# Patient Record
Sex: MALE | Race: WHITE | NOT HISPANIC OR LATINO | URBAN - METROPOLITAN AREA
[De-identification: names, ages, dates, MRNs, and addresses within clinical notes are randomized per-mention and may not be internally consistent; named-entity substitution may affect disease eponyms.]

---

## 2018-07-27 ENCOUNTER — OFFICE (OUTPATIENT)
Dept: URBAN - METROPOLITAN AREA CLINIC 33 | Facility: CLINIC | Age: 63
End: 2018-07-27

## 2018-07-27 VITALS
DIASTOLIC BLOOD PRESSURE: 82 MMHG | HEART RATE: 97 BPM | WEIGHT: 238 LBS | SYSTOLIC BLOOD PRESSURE: 133 MMHG | HEIGHT: 70 IN

## 2018-07-27 DIAGNOSIS — Z79.1 LONG TERM (CURRENT) USE OF NON-STEROIDAL ANTI-INFLAMMATORIES: ICD-10-CM

## 2018-07-27 DIAGNOSIS — D64.9 ANEMIA, UNSPECIFIED: ICD-10-CM

## 2018-07-27 DIAGNOSIS — R10.13 EPIGASTRIC PAIN: ICD-10-CM

## 2018-07-27 PROCEDURE — 99245 OFF/OP CONSLTJ NEW/EST HI 55: CPT

## 2018-07-27 PROCEDURE — 00031: CPT

## 2018-07-27 NOTE — SERVICEHPINOTES
KYMBERLY BRIGGS   is a   62   year old male who is being seen in consultation at the request of   MONISHA JEFFREY   for persistent anemia onset since 02/2018. He mentions having arthritis and using long term NSAIDs including Meloxicam and Diclofenac, which he recently stopped. He is followed by orthopedic specialist Dr Campos in [place] who recently did knee replacement 02/2019. He had pre op labs prior to surgery that found anemia and most recent labs by PCP on 06/2018 show RBC 4.51, slight low hgb/hct at 12.4/39.2. He reports epigastric pain described as "burning/dull," occurs few times per week, lasting couple hours, then goes away on its own, improved with drinking milk. He has no h/o PPI or H2RB use. He takes Tums for occasional heartburn that provides relief. He says pain does not wake him up at night. He has BM 1-2x/day, BSS 4 with complete evacuation and no straining. He notes at times will have "diarrhea" that is short lasting and then does not reoccur for months. No fm h/o CRC or IBD. Denies fevers, n/v, lower abdominal pain, melena, blood in stool, change in bowel habits, weight loss.  No prior colonoscopy or EGDBRNo cardiovascular or pulmonary disease

## 2018-08-07 ENCOUNTER — OFFICE (OUTPATIENT)
Dept: URBAN - METROPOLITAN AREA PATHOLOGY 17 | Facility: PATHOLOGY | Age: 63
End: 2018-08-07
Payer: COMMERCIAL

## 2018-08-07 ENCOUNTER — OFFICE (OUTPATIENT)
Dept: URBAN - METROPOLITAN AREA CLINIC 32 | Facility: CLINIC | Age: 63
End: 2018-08-07

## 2018-08-07 VITALS
DIASTOLIC BLOOD PRESSURE: 66 MMHG | WEIGHT: 238 LBS | SYSTOLIC BLOOD PRESSURE: 112 MMHG | RESPIRATION RATE: 21 BRPM | DIASTOLIC BLOOD PRESSURE: 81 MMHG | HEART RATE: 66 BPM | OXYGEN SATURATION: 94 % | SYSTOLIC BLOOD PRESSURE: 102 MMHG | SYSTOLIC BLOOD PRESSURE: 141 MMHG | RESPIRATION RATE: 14 BRPM | DIASTOLIC BLOOD PRESSURE: 70 MMHG | RESPIRATION RATE: 16 BRPM | OXYGEN SATURATION: 100 % | HEART RATE: 67 BPM | HEIGHT: 70 IN | DIASTOLIC BLOOD PRESSURE: 67 MMHG | HEART RATE: 63 BPM | OXYGEN SATURATION: 97 % | SYSTOLIC BLOOD PRESSURE: 113 MMHG | TEMPERATURE: 97.9 F

## 2018-08-07 DIAGNOSIS — D64.9 ANEMIA, UNSPECIFIED: ICD-10-CM

## 2018-08-07 DIAGNOSIS — R10.13 EPIGASTRIC PAIN: ICD-10-CM

## 2018-08-07 DIAGNOSIS — K25.7 CHRONIC GASTRIC ULCER WITHOUT HEMORRHAGE OR PERFORATION: ICD-10-CM

## 2018-08-07 DIAGNOSIS — K26.7 CHRONIC DUODENAL ULCER WITHOUT HEMORRHAGE OR PERFORATION: ICD-10-CM

## 2018-08-07 PROBLEM — K26.9 DUODENAL ULCER, UNSP AS ACUTE OR CHRONIC, W/O HEMOR OR PERF: Status: ACTIVE | Noted: 2018-08-07

## 2018-08-07 PROBLEM — K25.3 ACUTE GASTRIC ULCER WITHOUT HEMORRHAGE OR PERFORATION: Status: ACTIVE | Noted: 2018-08-07

## 2018-08-07 PROCEDURE — 88342 IMHCHEM/IMCYTCHM 1ST ANTB: CPT

## 2018-08-07 PROCEDURE — 88305 TISSUE EXAM BY PATHOLOGIST: CPT

## 2018-08-07 PROCEDURE — 43239 EGD BIOPSY SINGLE/MULTIPLE: CPT

## 2018-08-07 PROCEDURE — 88313 SPECIAL STAINS GROUP 2: CPT

## 2018-08-07 RX ORDER — MELOXICAM 15 MG/1
TABLET ORAL
Qty: 30 | Status: COMPLETED
End: 2018-08-07

## 2018-08-07 RX ORDER — PANTOPRAZOLE SODIUM 40 MG/1
TABLET, DELAYED RELEASE ORAL
Qty: 60 | Refills: 3 | Status: COMPLETED
Start: 2018-08-07 | End: 2018-10-02

## 2018-08-07 RX ORDER — PANTOPRAZOLE SODIUM 40 MG/1
TABLET, DELAYED RELEASE ORAL
Qty: 60 | Refills: 3 | Status: ACTIVE
Start: 2018-08-07

## 2018-08-07 RX ADMIN — LIDOCAINE HYDROCHLORIDE 40 MG: 20 INJECTION, SOLUTION INFILTRATION; PERINEURAL at 13:54

## 2018-08-07 RX ADMIN — LIDOCAINE HYDROCHLORIDE 60 MG: 20 INJECTION, SOLUTION INFILTRATION; PERINEURAL at 13:54

## 2018-09-04 ENCOUNTER — OFFICE (OUTPATIENT)
Dept: URBAN - METROPOLITAN AREA PATHOLOGY 17 | Facility: PATHOLOGY | Age: 63
End: 2018-09-04
Payer: COMMERCIAL

## 2018-09-04 ENCOUNTER — OFFICE (OUTPATIENT)
Dept: URBAN - METROPOLITAN AREA CLINIC 32 | Facility: CLINIC | Age: 63
End: 2018-09-04
Payer: COMMERCIAL

## 2018-09-04 VITALS
RESPIRATION RATE: 20 BRPM | SYSTOLIC BLOOD PRESSURE: 121 MMHG | SYSTOLIC BLOOD PRESSURE: 115 MMHG | RESPIRATION RATE: 18 BRPM | OXYGEN SATURATION: 97 % | DIASTOLIC BLOOD PRESSURE: 83 MMHG | SYSTOLIC BLOOD PRESSURE: 143 MMHG | DIASTOLIC BLOOD PRESSURE: 64 MMHG | SYSTOLIC BLOOD PRESSURE: 125 MMHG | DIASTOLIC BLOOD PRESSURE: 67 MMHG | WEIGHT: 238 LBS | RESPIRATION RATE: 16 BRPM | DIASTOLIC BLOOD PRESSURE: 61 MMHG | SYSTOLIC BLOOD PRESSURE: 110 MMHG | TEMPERATURE: 97.3 F | HEIGHT: 70 IN | DIASTOLIC BLOOD PRESSURE: 76 MMHG | SYSTOLIC BLOOD PRESSURE: 109 MMHG | RESPIRATION RATE: 14 BRPM | HEART RATE: 61 BPM | RESPIRATION RATE: 15 BRPM | DIASTOLIC BLOOD PRESSURE: 65 MMHG | HEART RATE: 56 BPM | TEMPERATURE: 97.5 F | HEART RATE: 58 BPM | HEART RATE: 57 BPM | HEART RATE: 62 BPM | DIASTOLIC BLOOD PRESSURE: 84 MMHG | OXYGEN SATURATION: 93 % | OXYGEN SATURATION: 98 % | SYSTOLIC BLOOD PRESSURE: 127 MMHG | SYSTOLIC BLOOD PRESSURE: 145 MMHG | HEART RATE: 66 BPM

## 2018-09-04 DIAGNOSIS — D12.4 BENIGN NEOPLASM OF DESCENDING COLON: ICD-10-CM

## 2018-09-04 DIAGNOSIS — Z12.11 ENCOUNTER FOR SCREENING FOR MALIGNANT NEOPLASM OF COLON: ICD-10-CM

## 2018-09-04 PROBLEM — K57.30 DVRTCLOS OF LG INT W/O PERFORATION OR ABSCESS W/O BLEEDING: Status: ACTIVE | Noted: 2018-09-04

## 2018-09-04 PROBLEM — D12.0 BENIGN NEOPLASM OF CECUM: Status: ACTIVE | Noted: 2018-09-04

## 2018-09-04 PROBLEM — K64.8 OTHER HEMORRHOIDS: Status: ACTIVE | Noted: 2018-09-04

## 2018-09-04 PROCEDURE — 88305 TISSUE EXAM BY PATHOLOGIST: CPT

## 2018-09-04 PROCEDURE — 45385 COLONOSCOPY W/LESION REMOVAL: CPT | Mod: PT

## 2018-10-02 ENCOUNTER — OFFICE (OUTPATIENT)
Dept: URBAN - METROPOLITAN AREA CLINIC 32 | Facility: CLINIC | Age: 63
End: 2018-10-02

## 2018-10-02 ENCOUNTER — OFFICE (OUTPATIENT)
Dept: URBAN - METROPOLITAN AREA PATHOLOGY 17 | Facility: PATHOLOGY | Age: 63
End: 2018-10-02
Payer: COMMERCIAL

## 2018-10-02 VITALS
HEART RATE: 65 BPM | HEIGHT: 70 IN | HEART RATE: 62 BPM | OXYGEN SATURATION: 97 % | RESPIRATION RATE: 15 BRPM | TEMPERATURE: 97.5 F | SYSTOLIC BLOOD PRESSURE: 114 MMHG | HEART RATE: 74 BPM | DIASTOLIC BLOOD PRESSURE: 79 MMHG | RESPIRATION RATE: 14 BRPM | SYSTOLIC BLOOD PRESSURE: 116 MMHG | DIASTOLIC BLOOD PRESSURE: 73 MMHG | RESPIRATION RATE: 13 BRPM | TEMPERATURE: 97.9 F | DIASTOLIC BLOOD PRESSURE: 75 MMHG | OXYGEN SATURATION: 93 % | WEIGHT: 238 LBS | DIASTOLIC BLOOD PRESSURE: 72 MMHG | OXYGEN SATURATION: 99 % | SYSTOLIC BLOOD PRESSURE: 113 MMHG | HEART RATE: 64 BPM | SYSTOLIC BLOOD PRESSURE: 138 MMHG

## 2018-10-02 DIAGNOSIS — K25.7 CHRONIC GASTRIC ULCER WITHOUT HEMORRHAGE OR PERFORATION: ICD-10-CM

## 2018-10-02 DIAGNOSIS — R10.13 EPIGASTRIC PAIN: ICD-10-CM

## 2018-10-02 PROCEDURE — 43239 EGD BIOPSY SINGLE/MULTIPLE: CPT

## 2018-10-02 PROCEDURE — 88305 TISSUE EXAM BY PATHOLOGIST: CPT

## 2018-10-02 RX ORDER — LANSOPRAZOLE 30 MG/1
CAPSULE, DELAYED RELEASE PELLETS ORAL
Qty: 30 | Refills: 5 | Status: ACTIVE

## 2018-10-02 RX ORDER — PANTOPRAZOLE SODIUM 40 MG/1
TABLET, DELAYED RELEASE ORAL
Qty: 60 | Refills: 3 | Status: COMPLETED
Start: 2018-08-07 | End: 2018-10-02

## 2018-10-15 PROBLEM — R10.13 ABDOMINAL PAIN, EPIGASTRIC: Status: ACTIVE | Noted: 2018-10-02

## 2018-11-05 ENCOUNTER — OFFICE (OUTPATIENT)
Dept: URBAN - METROPOLITAN AREA CLINIC 33 | Facility: CLINIC | Age: 63
End: 2018-11-05

## 2018-11-05 VITALS
HEIGHT: 70 IN | SYSTOLIC BLOOD PRESSURE: 146 MMHG | WEIGHT: 235 LBS | DIASTOLIC BLOOD PRESSURE: 85 MMHG | TEMPERATURE: 97.5 F | HEART RATE: 64 BPM

## 2018-11-05 DIAGNOSIS — C64.9 MALIGNANT NEOPLASM OF UNSPECIFIED KIDNEY, EXCEPT RENAL PELVI: ICD-10-CM

## 2018-11-05 DIAGNOSIS — K90.0 CELIAC DISEASE: ICD-10-CM

## 2018-11-05 PROCEDURE — 99213 OFFICE O/P EST LOW 20 MIN: CPT
